# Patient Record
Sex: FEMALE
[De-identification: names, ages, dates, MRNs, and addresses within clinical notes are randomized per-mention and may not be internally consistent; named-entity substitution may affect disease eponyms.]

---

## 2019-07-09 ENCOUNTER — HOSPITAL ENCOUNTER (INPATIENT)
Dept: HOSPITAL 97 - L&D | Age: 24
LOS: 2 days | Discharge: HOME | End: 2019-07-11
Attending: SPECIALIST | Admitting: SPECIALIST
Payer: COMMERCIAL

## 2019-07-09 VITALS — BODY MASS INDEX: 25.7 KG/M2

## 2019-07-09 DIAGNOSIS — Z67.91: ICD-10-CM

## 2019-07-09 DIAGNOSIS — O99.323: ICD-10-CM

## 2019-07-09 DIAGNOSIS — O26.893: ICD-10-CM

## 2019-07-09 DIAGNOSIS — F15.90: ICD-10-CM

## 2019-07-09 DIAGNOSIS — Z3A.34: ICD-10-CM

## 2019-07-09 DIAGNOSIS — F12.90: ICD-10-CM

## 2019-07-09 LAB
BLD SMEAR INTERP: (no result)
GLUCOSE SERPLBLD-MCNC: 107 MG/DL (ref 74–106)
HCT VFR BLD CALC: 38 % (ref 36–45)
LYMPHOCYTES # SPEC AUTO: 1.1 K/UL (ref 0.7–4.9)
METHAMPHET UR QL SCN: POSITIVE
MORPHOLOGY BLD-IMP: (no result)
PMV BLD: 9.5 FL (ref 7.6–11.3)
RBC # BLD: 4.21 M/UL (ref 3.86–4.86)
RPR SER QL: (no result)
THC SERPL-MCNC: POSITIVE NG/ML
UA DIPSTICK W REFLEX MICRO PNL UR: (no result)

## 2019-07-09 PROCEDURE — 86592 SYPHILIS TEST NON-TREP QUAL: CPT

## 2019-07-09 PROCEDURE — 86850 RBC ANTIBODY SCREEN: CPT

## 2019-07-09 PROCEDURE — 87340 HEPATITIS B SURFACE AG IA: CPT

## 2019-07-09 PROCEDURE — 90707 MMR VACCINE SC: CPT

## 2019-07-09 PROCEDURE — 0HQ9XZZ REPAIR PERINEUM SKIN, EXTERNAL APPROACH: ICD-10-PCS

## 2019-07-09 PROCEDURE — 90715 TDAP VACCINE 7 YRS/> IM: CPT

## 2019-07-09 PROCEDURE — 86762 RUBELLA ANTIBODY: CPT

## 2019-07-09 PROCEDURE — 85025 COMPLETE CBC W/AUTO DIFF WBC: CPT

## 2019-07-09 PROCEDURE — 36415 COLL VENOUS BLD VENIPUNCTURE: CPT

## 2019-07-09 PROCEDURE — 87086 URINE CULTURE/COLONY COUNT: CPT

## 2019-07-09 PROCEDURE — 81003 URINALYSIS AUTO W/O SCOPE: CPT

## 2019-07-09 PROCEDURE — 90471 IMMUNIZATION ADMIN: CPT

## 2019-07-09 PROCEDURE — 86901 BLOOD TYPING SEROLOGIC RH(D): CPT

## 2019-07-09 PROCEDURE — 80307 DRUG TEST PRSMV CHEM ANLYZR: CPT

## 2019-07-09 PROCEDURE — 88307 TISSUE EXAM BY PATHOLOGIST: CPT

## 2019-07-09 PROCEDURE — 87088 URINE BACTERIA CULTURE: CPT

## 2019-07-09 PROCEDURE — 86900 BLOOD TYPING SEROLOGIC ABO: CPT

## 2019-07-09 PROCEDURE — 82947 ASSAY GLUCOSE BLOOD QUANT: CPT

## 2019-07-10 LAB
HCT VFR BLD CALC: 32.9 % (ref 36–45)
LYMPHOCYTES # SPEC AUTO: 3.3 K/UL (ref 0.7–4.9)
PMV BLD: 9.8 FL (ref 7.6–11.3)
RBC # BLD: 3.69 M/UL (ref 3.86–4.86)

## 2019-07-10 RX ADMIN — IBUPROFEN PRN MG: 200 TABLET, SUGAR COATED ORAL at 03:25

## 2019-07-10 NOTE — PN
Subjective:  Patient had a very good response to the first 25 mcg Cytotec, was kj every 1-2 
minutes, therefore it was decided not to put in the second Cytotec.  She progressed to approximately 
3 cm, 60-70% effaced, vertex, well applied.  She is still kj every 1-2 minutes.  Baby looks 
very good.  Vital signs are all stable.  She had admission urinalysis, it looks somewhat suspicious f
or a bladder infection.  We will probably have the culture sometime tomorrow.  Patient informed, this
 morning, rupture of membranes, clear fluid.  The patient had Stadol during the night.  Says her last
 delivery was completely natural without Stadol or anything but she came in active rapidly advancing 
labor.  She can have more Stadol if she wishes and of course, if she wishes an epidural that is up to
 her too.  Full labor talk again given this morning with the patient and .  Anticipate deliver
y sometime later today.





CLAIRE/BILLY

DD:  07/10/2019 07:19:26Voice ID:  [id number]

DT:  07/10/2019 11:46:30Report ID:  [id number]

## 2019-07-10 NOTE — PREOPHP
Date of Admission:  2019



A 24-year-old primigravida, 34 weeks, followed antepartum at [facility], admits to marijuana use, drop in t
o our institution in advanced labor, basically complete.  She says her membranes may have ruptured la
st night and started to have contractions this morning around 6 or 7 a.m.  She came here instead of [facility]ie [facility] to [place].  She was brought in by EMT, noted to be basically 9 cm to 10 cm, 100% effaced, vert
ex, +1 station, with the desire to push.  No lab available.  Patient claims no allergies.  She had an
 IV already.  Within 3 minutes of my arrival, patient started pushing and pushed for about 2-3 contra
ctions and spontaneously delivered a 4 pound 7 ounce female, Apgars 9 and 9.  Dr. Gramajo in pediatric
 attendance will get a drop-in drug screen on the patient.  She sustained a left labia minora lacerat
ion.  This was sutured with 2-0 chromic under local infiltration.  A single right vaginal minora lace
ration with single figure-of-eight stitch on that side.  Schultze delivery of the placenta, which was
 noted to be intact.  Estimated blood loss 300 cc.  Placenta will be saved for pathologic exam.  The 
patient is a very poor historian, but so far what we got is she thinks her bag of water may have brok
en last night and she did not seek medical care until shortly before admission to our hospital.  We a
re getting labs and records sent from [facility].  Apparently, the patient has a history of asthma and has 
been on albuterol inhaler.  Other lab indices show nothing of consequence at this time.  We did not h
er type and Rh, hepatitis or HIV status.  Of course she admits to marijuana, but will get another blo
od test, which apparently [facility] did and was negative at that time.  By good dates and ultrasound, she 
is 34 weeks.  Baby seems to be doing well at this point.



Final Diagnoses:  

1. labor and delivery.

2.Drop-in transportation.

3.Drug screen pending.

4.Uneventful vaginal delivery.





CLAIRE/BILLY

DD:  2019 15:49:10Voice ID:  [id number]
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
## 2019-07-11 VITALS — DIASTOLIC BLOOD PRESSURE: 65 MMHG | TEMPERATURE: 97.8 F | SYSTOLIC BLOOD PRESSURE: 116 MMHG

## 2019-07-11 RX ADMIN — IBUPROFEN PRN MG: 200 TABLET, SUGAR COATED ORAL at 17:35

## 2019-07-12 NOTE — DS
Date of Discharge:  07/11/2019



Alcira Hernandez, a 24-year-old primigravida at 34 weeks, followed antepartum by [facility], although it turns 
out, she had 1 visit.  Came into our institution in advanced labor, delivered rapidly of a 4 pounds 7
 ounce female, Apgars 9 and 9.  Small first-degree lacerations repaired with 2-0 chromic. 



DICTATION ENDS HERE





CLAIRE/BILLY

DD:  07/11/2019 07:23:50Voice ID:  [id number]

DT:  07/12/2019 05:56:44Report ID:  [id number]
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
## 2024-08-22 ENCOUNTER — HOSPITAL ENCOUNTER (EMERGENCY)
Dept: HOSPITAL 97 - ER | Age: 29
Discharge: HOME | End: 2024-08-22
Payer: COMMERCIAL

## 2024-08-22 VITALS — DIASTOLIC BLOOD PRESSURE: 69 MMHG | OXYGEN SATURATION: 100 % | TEMPERATURE: 98.2 F | SYSTOLIC BLOOD PRESSURE: 113 MMHG

## 2024-08-22 DIAGNOSIS — K02.9: Primary | ICD-10-CM

## 2024-08-22 PROCEDURE — 99284 EMERGENCY DEPT VISIT MOD MDM: CPT

## 2024-08-22 NOTE — XMS REPORT
Continuity of Care Document



                           Created on: 2024





ALCIRA HERNANDEZ

External Reference #: [id number]

: 1995

Sex: Female



Demographics





                                        Address             [address]

[place]  [address]

 

                                        Home Phone          [phone]

 

                                        Work Phone          [phone]

 

                                        Mobile Phone        [phone]

 

                                        Email Address       NONE

 

                                        Preferred Language  English

 

                                        Marital Status      Unknown

 

                                        [employer] Affiliation Unknown

 

                                        Race                Unknown

 

                                        Additional Race(s)  Unavailable

White

 

                                        Ethnic Group        Unknown





Author





                                        Name                Unknown

 

                                        Address             [address]

[place]  [address]

 

                                        [facility]

 

                                        Address             [address]

[address]

[place]  [address]

 

                                        Phone               [phone]





Support





                          Name         Relationship Address      Phone

 

                                Frankie Gonsales           [address]

[place]  [address]                     [phone]

 

                                [facility]    [address] [place]  [address]                        [phone]





Care Team Providers





                                Care Team Member Name Role            Phone

 

                                ENID FERRERA Primary Care Physician Unav

ailCATHERINE Kirby Attending Clinician UnavailCatherine Fields CNM Attending Clinician +

[phone]

 

                                Catherine Vazquez CNM Attending Clinician [phone]

 

                                ENID FERRERA Attending Clinician Unavail

able

 

                                Maisha Enid SCOTT Attending Clinician +

[phone]

 

                                Doctor Unassigned, No Name Attending Clinician U

navailable

 

                                [facility] Unava

ilable

 

                                AMISH WEINSTEIN Attending Clinician Unavailab

Amish Stanton Attending Clinician +

[phone]

 

                                ELIZABETH HOFFMAN Attending Clinician UnavailElizabeth Rueda Attending Clinician [phone]

 

                                Jvai Peña MD Attending Clinician [phone]

 

                                ATTILA NEW  Attending Clinician Unavailable

 

                                ATTILA NEW  Attending Clinician Unavailable

 

                                JAVI PEÑA   Admitting Clinician Unavailable







Payers





                    Payer Name Policy Type Policy Number Effective Date Expirati

on Date Source

 

                          [facility]              [id number]     

00:00:00                                            

 

                                                    [facility]                [id number]                              







Problems





                                                    Condition 

Name                                    Condition 

Details                                 Condition 

Category                  Status                    Onset 

Date                                    Resolution 

Date                                    Last 

Treatment 

Date                                    Treating 

Clinician                 Comments                  Source

 

                                                    History of 

asthma                                  History of 

asthma              Disease             Active               

00:00:

00                                                              Overview: 

Formattin

g of this 

note 

might be 

different 

from the 

original.

As child 

only                                    [facility]

 

                                                    Obesity 

affecting 

pregnancy                               Obesity 

affecting 

pregnancy           Disease             Active               

00:00:

00                                                               [facility]

 

                                                    Rh 

negative 

state in 

antepartum 

period                                  Rh 

negative 

state in 

antepartum 

period              Disease             Active              2020-0

3-24 

00:00:

00                                                              Overview: 

Formattin

g of this 

note 

might be 

different 

from the 

original.

Rhogam at 

28 weeks                                [facility]

 

                                                    High-risk 

pregnancy                               High-risk 

pregnancy           Disease             Active              2020-0

3-23 

00:00:

00                                                               [facility]

 

                                                    History of 

 

delivery                                History of 

 

delivery            Disease             Active              2020-0

3-23 

00:00:

00                                                              Overview: 

Formattin

g of this 

note 

might be 

different 

from the 

original.

Reports 

at 34 

weeks 

PPROM                                   [facility]

 

                                                    Other 

general 

counseling 

and advice 

for 

contracept

gloria 

management                              Other 

general 

counseling 

and advice 

for 

contracept

gloria 

management                Disease                   Resolve

d                                       2020

0-15 

00:00:

00                                      2024 

00:00:00                                2024 

14:04:22                                                    [facility]

 

                                                    Abdominal 

pain, 

generalize

d                                       Abdominal 

pain, 

generalize

d                         Disease                   Resolve

d                                       2020

0-15 

00:00:

00                                      2024 

00:00:00                                2024 

14:04:18                                                    [facility]

 

                                                    Obesity 

(BMI 

30-39.9)                                Obesity 

(BMI 

30-39.9)                  Disease                   Resolve

d                                       

3 

00:00:

00                                      2024 

00:00:00                                2024 

14:04:24                                                    [facility]

 

                                                    Postpartum 

care and 

examinatio

n of 

lactating 

mother                                  Postpartum 

care and 

examinatio

n of 

lactating 

mother                    Disease                   Resolve

d                                       24 

00:00:

00                                      2020-10-15 

00:00:00                                2020-10-15 

14:06:15                                                    [facility]

 

                                                    Normal 

labor                                   Normal 

labor                     Disease                   Resolve

d                                        

00:00:

00                                      2020-10-15 

00:00:00                                2020-10-15 

14:06:38                                                    [facility]

 

                                                    37 weeks 

gestation 

of 

pregnancy                               37 weeks 

gestation 

of 

pregnancy                 Disease                   Resolve

d                                       -0

8-31 

00:00:

00                                      2020-10-15 

00:00:00                                2020-10-15 

14:06:49                                                    [facility]

 

                                                    COVID-19 

virus 

infection                               COVID-19 

virus 

infection                 Disease                   Resolve

d                                       -

8-31 

00:00:

00                                      2020-10-15 

00:00:00                                2020-10-15 

14:06:48                                                    [facility]

 

                                                    Liveborn 

infant, of 

neal 

pregnancy, 

born in 

hospital 

by vaginal 

delivery                                Liveborn 

infant, of 

neal 

pregnancy, 

born in 

hospital 

by vaginal 

delivery                  Disease                   Resolve

d                                       

8- 

00:00:

00                                      2020-10-15 

00:00:00                                2020-10-15 

14:06:42                                                    [facility]

 

                                                    Maternal 

varicella, 

non-immune                              Maternal 

varicella, 

non-immune          Disease             Active              -

3-24 

00:00:

00                                      2020-10-15 

00:00:00                                2020-10-15 

14:06:11                                            Overview: 

Formattin

g of this 

note 

might be 

different 

from the 

original.

Address 

pp                                      [facility]

 

                                                    Multiparit

y                                       Multiparit

y                         Disease                   Resolve

d                                       -0

3-23 

00:00:

00                                      2020-10-15 

00:00:00                                2020-10-15 

14:06:40                                                    [facility]

 

                                                    Family 

history of 

defect of 

abdominal 

wall                                    Family 

history of 

defect of 

abdominal 

wall                      Disease                   Resolve

d                                       -0

4-09 

00:00:

00                                      2020-10-15 

00:00:00                                2020-10-15 

14:06:45                                                    [facility]

 

                                                    Vaginal 

bleeding 

in 

pregnancy                               Vaginal 

bleeding 

in 

pregnancy                 Disease                   Resolve

d                                       -0

7-23 

00:00:

00                                      2020 

00:00:00                                2020 

17:05:30                                                    [facility]

 

                                                    Yeast 

infection                               Yeast 

infection                 Disease                   Resolve

d                                       -0

3-26 

00:00:

00                                      2020 

00:00:00                                2020 

17:05:32                                                    [facility]







Allergies, Adverse Reactions, Alerts





                                                    Allergy 

Name                                    Allergy 

Type            Status          Severity        Reaction(s)     Onset 

Date                                    Inactive 

Date                                    Treating 

Clinician                 Comments                  Source

 

                                                    NO KNOWN 

ALLERGIE

S                                       Drug 

Class   Active                                                  [facility]







Social History





                    Social Habit Start Date Stop Date Quantity  Comments  Source

 

                                        ASSERTION           2024 

00:00:00                        Pregnancy                       [facility]

 

                    [facility]

 

                                                    Alcoholic beverage 

intake                                  2024 

00:00:00                                2024 

00:00:00                                Ex-drinker 

(finding)                                           [facility]

 

                                        Alcohol intake      2023 

00:00:00                                2023 

00:00:00                                Current 

non-drinker of 

alcohol 

(finding)                                           [facility]

 

                                                    Exposure to 

SARS-CoV-2 (event)                      2023 

00:00:00                                2023 

14:02:00            Not sure                                [facility]

 

                                                    Tobacco use and 

exposure                                2022 

00:00:00                                2022 

00:00:00                                Smokeless 

tobacco non-user                                    [facility]

 

                                                    History of Social 

function                                2022 

00:00:00                                2022 

00:00:00                                                    [facility]

 

                                                    Sex assigned at 

birth                                   1995 

00:00:00                                1995 

00:00:00                                                    [facility]







                          Smoking Status Start Date   Stop Date    Source

 

                          Never smoked tobacco                           [facility]







Medications





                                                    Ordered 

Medication 

Name                                    Filled 

Medication 

Name                                    Start 

Date                                    Stop 

Date                                    Current 

Medication?                             Ordering 

Clinician       Indication      Dosage          Frequency       Signature 

(SIG)               Comments            Components          Source

 

                                                    medroxyPROG

ESTERone 

(DEPO-PROVE

RA) syringe 

150 mg                                              2023 

15:30:

00                                       

19:05

:03     No              [id number] 150mg                                   [facility]

 

                                                    medroxyPROG

ESTERone 

(DEPO-PROVE

RA) syringe 

150 mg                                               

17:45:

00                                       

20:04

:00        No                    [id number]  150mg                 150 mg, 

Intramuscu

lar, 

[id number], 

3 doses, 

First dose 

on 22 at 

1245, Last 

dose on 

23 at 

1245, 

Routine                                                     [facility]

 

                                                    medroxyPROG

ESTERone 

(DEPO-PROVE

RA) 

injection 

150 mg                                              

3-17 

21:30:

00                                       

22:29

:00     No              [id number] 150mg                                   [facility]

 

                                                    medroxyPROG

ESTERone 

(DEPO-PROVE

RA) 

injection 

150 mg                                              2020-1

0-15 

19:15:

00                                       

15:04

:00     No              [id number] 150mg                                   [facility]

 

                                                    prenatal 

vit 

calc,iron,f

olic 

(PRENATAL 

VITAMIN 

ORAL)                                                

05:18:

34                                       

00:00

:00        No                                                     Take by 

mouth.                                                      [facility]

 

                                                    prenatal 

vitamin 

w/FA tablet                                          

00:00:

00                                       

00:00

:00                 No                                      [id number]

102                 1{tbl}                                  Take 1 

tablet by 

mouth 

daily.                                                      [facility]

 

                                                    docusate 

calcium 240 

mg capsule                                           

00:00:

00                                       

00:00

:00                 No                                      [id number]

102                 240mg                                   Take 1 

capsule by 

mouth once 

daily as 

needed for 

Constipati

on.                                                         [facility]

 

                                                    ferrous 

sulfate 325 

mg (65 mg 

iron) 

tablet                                               

00:00:

 

00:00

:00                 No                                      [id number]

102                 325mg                                   Take 1 

tablet by 

mouth 2 

(two) 

times 

daily.                                                      [facility]

 

                                                    ibuprofen 

600 mg 

tablet                                               

00:00:

00                                       

00:00

:00                 No                                      [id number]

102                 600mg                                   Take 1 

tablet by 

mouth 

every 6 

(six) 

hours as 

needed 

(Pain). 

Take with 

food or 

milk.                                                       [facility]

 

                                                    prenatal 

vitamin 

w/FA tablet                                          

00:00:

00                                       

00:00

:00                 No                                      [id number]

102                 1{tbl}                                  Take 1 

tablet by 

mouth 

daily.                                                      [facility]

 

                                                    albuterol 

90 

mcg/actuati

on inhaler                                           

00:00:

 

00:00

:00        No                    [id number]  2{puff}               Inhale 2 

Puffs 

every 6 

(six) 

hours as 

needed for 

Wheezing 

or 

Shortness 

of Breath.                                                  [facility]







Immunizations





                                                    Ordered 

Immunization Name                       Filled 

Immunization Name Date            Status          Comments        Source

 

                                HPV9                            2021-04-15 

00:00:00            Completed                               [facility]

 

                                HPV9                            2021-04-15 

00:00:00            Completed                               [facility]

 

                                HPV9                            2021-04-15 

00:00:00            Completed                               [facility]

 

                                HPV9                            2021-04-15 

00:00:00            Completed                               [facility]

 

                                HPV9                            2021-04-15 

00:00:00            Completed                               [facility]

 

                                HPV9                            2021-04-15 

00:00:00            Completed                               [facility]

 

                                HPV9                            2021 

00:00:00            Completed                               [facility]

 

                                HPV9                            2021 

00:00:00            Completed                               [facility]

 

                                HPV9                            2021 

00:00:00            Completed                               [facility]

 

                                HPV9                            2021 

00:00:00            Completed                               [facility]

 

                                HPV9                            2021 

00:00:00            Completed                               [facility]

 

                                HPV9                            2021 

00:00:00            Completed                               [facility]

 

                                HPV9                            2020-10-15 

00:00:00            Completed                               [facility]

 

                                HPV9                            2020-10-15 

00:00:00            Completed                               [facility]

 

                                HPV9                            2020-10-15 

00:00:00            Completed                               [facility]

 

                                HPV9                            2020-10-15 

00:00:00            Completed                               [facility]

 

                                HPV9                            2020-10-15 

00:00:00            Completed                               [facility]

 

                                HPV9                            2020-10-15 

00:00:00            Completed                               [facility]

 

                                TDAP                            2020 

00:00:00            Completed                               [facility]

 

                                                    Rho (d) Immune 

Globulin                                            2020 

00:00:00            Completed                               [facility]

 

                                TDAP                            2020 

00:00:00            Completed                               [facility]

 

                                                    Rho (d) Immune 

Globulin                                            2020 

00:00:00            Completed                               [facility]

 

                                TDAP                            2020 

00:00:00            Completed                               [facility]

 

                                                    Rho (d) Immune 

Globulin                                            2020 

00:00:00            Completed                               [facility]

 

                                TDAP                            2020 

00:00:00            Completed                               [facility]

 

                                                    Rho (d) Immune 

Globulin                                            2020 

00:00:00            Completed                               [facility]

 

                                TDAP                            2020 

00:00:00            Completed                               [facility]

 

                                                    Rho (d) Immune 

Globulin                                            2020 

00:00:00            Completed                               [facility]

 

                                TDAP                            2020 

00:00:00            Completed                               [facility]

 

                                                    Rho (d) Immune 

Globulin                                            2020 

00:00:00            Completed                               [facility]

 

                    TDAP                Unknown   Completed           [facility]

 

                                                    Rho (d) Immune 

Globulin                  Unknown      Completed                 [facility]

 

                    HPV9                Unknown   Completed           [facility]

 

                    HPV9                Unknown   Completed           [facility]

 

                    HPV9                Unknown   Completed           [facility]

 

                    TDAP                Unknown   Completed           [facility]

 

                                                    Rho (d) Immune 

Globulin                  Unknown      Completed                 [facility]

 

                    HPV9                Unknown   Completed           [facility]

 

                    TDAP                Unknown   Completed           [facility]

 

                                                    Rho (d) Immune 

Globulin                  Unknown      Completed                 [facility]

 

                    TDAP                Unknown   Completed           [facility]

 

                                                    Rho (d) Immune 

Globulin                  Unknown      Completed                 [facility]

 

                    TDAP                Unknown   Completed           [facility]

 

                                                    Rho (d) Immune 

Globulin                  Unknown      Completed                 [facility]

 

                    TDAP                Unknown   Completed           [facility]

 

                                                    Rho (d) Immune 

Globulin                  Unknown      Completed                 [facility]

 

                    TDAP                Unknown   Completed           [facility]

 

                                                    Rho (d) Immune 

Globulin                  Unknown      Completed                 [facility]

 

                    HPV9                Unknown   Completed           [facility]

 

                    HPV9                Unknown   Completed           [facility]

 

                    HPV9                Unknown   Completed           [facility]

 

                    TDAP                Unknown   Completed           [facility]

 

                                                    Rho (d) Immune 

Globulin                  Unknown      Completed                 [facility]

 

                    HPV9                Unknown   Completed           [facility]

 

                    HPV9                Unknown   Completed           [facility]

 

                    HPV9                Unknown   Completed           [facility]

 

                    TDAP                Unknown   Completed           [facility]

 

                                                    Rho (d) Immune 

Globulin                  Unknown      Completed                 [facility]

 

                    HPV9                Unknown   Completed           [facility]

 

                    HPV9                Unknown   Completed           [facility]

 

                    HPV9                Unknown   Completed           [facility]

 

                    TDAP                Unknown   Completed           [facility]

 

                                                    Rho (d) Immune 

Globulin                  Unknown      Completed                 [facility]

 

                    HPV9                Unknown   Completed           [facility]

 

                    HPV9                Unknown   Completed           [facility]

 

                    HPV9                Unknown   Completed           [facility]

 

                    TDAP                Unknown   Completed           [facility]

 

                                                    Rho (d) Immune 

Globulin                  Unknown      Completed                 [facility]

 

                    HPV9                Unknown   Completed           [facility]

 

                    HPV9                Unknown   Completed           [facility]

 

                    HPV9                Unknown   Completed           [facility]

 

                    TDAP                Unknown   Completed           [facility]

 

                                                    Rho (d) Immune 

Globulin                  Unknown      Completed                 [facility]

 

                    HPV9                Unknown   Completed           [facility]

 

                    HPV9                Unknown   Completed           [facility]

 

                    HPV9                Unknown   Completed           [facility]







Vital Signs





                      Vital Name Observation Time Observation Value Comments   S

ource

 

                                                    Systolic blood 

pressure        2024 18:38:00 122 mm[Hg]                      [facility]

 

                                                    Diastolic blood 

pressure        2024 18:38:00 67 mm[Hg]                       [facility]

 

                      Heart rate 2024 18:38:00 74 /min               [facility]

[facility]

 

                      Body temperature 2024 18:38:00 36.61 Bisi            

 [facility]

 

                      Body height 2024 18:38:00 162.6 cm              [facility]

 

                      Body weight 2024 18:38:00 88.089 kg             [facility]

 

                      BMI        2024 18:38:00 33.33 kg/m2            [facility]

 

                                                    Systolic blood 

pressure        2023 14:08:00 122 mm[Hg]                      [facility]

 

                                                    Diastolic blood 

pressure        2023 14:08:00 62 mm[Hg]                       [facility]

 

                      Heart rate 2023 14:08:00 54 /min               [facility]

[facility]

 

                      Body temperature 2023 14:08:00 35.94 Bisi            

 [facility]

 

                      Respiratory rate 2023 14:08:00 18 /min              

 [facility]

 

                      Body height 2023 14:08:00 162.6 cm              [facility]

 

                      Body weight 2023 14:08:00 82.01 kg              [facility]

 

                      BMI        2023 14:08:00 31.03 kg/m2            [facility]

[facility]

 

                                                    Systolic blood 

pressure        2023 20:02:00 121 mm[Hg]                      [facility]

 

                                                    Diastolic blood 

pressure        2023 20:02:00 77 mm[Hg]                       [facility]

 

                      Heart rate 2023 20:02:00 71 /min               [facility]

[facility]

 

                      Body temperature 2023 20:02:00 36.61 Bisi            

 [facility]

 

                      Respiratory rate 2023 20:02:00 18 /min              

 [facility]

 

                      Body height 2023 20:02:00 162.6 cm              [facility]

[facility]

 

                      Body weight 2023 20:02:00 81.829 kg             [facility]

[facility]

 

                      BMI        2023 20:02:00 30.97 kg/m2            [facility]

[facility]

 

                                                    Systolic blood 

pressure        2022 20:01:00 110 mm[Hg]                      [facility]

 

                                                    Diastolic blood 

pressure        2022 20:01:00 64 mm[Hg]                       [facility]

 

                      Heart rate 2022 20:01:00 67 /min               [facility]

[facility]

 

                      Body temperature 2022 20:01:00 36.56 Bisi            

 [facility]

 

                      Respiratory rate 2022 20:01:00 17 /min              

 [facility]

 

                      Body height 2022 20:01:00 162.6 cm              [facility]

[facility]

 

                      Body weight 2022 20:01:00 81.466 kg             [facility]

[facility]

 

                      BMI        2022 20:01:00 30.83 kg/m2            [facility]

[facility]

 

                                                    Systolic blood 

pressure        2022 16:53:00 110 mm[Hg]                      [facility]

 

                                                    Diastolic blood 

pressure        2022 16:53:00 62 mm[Hg]                       [facility]

 

                      Heart rate 2022 16:53:00 68 /min               Unive

[facility]

 

                      Body temperature 2022 16:53:00 36.56 Bisi            

 [facility]

 

                      Respiratory rate 2022 16:53:00 18 /min              

 [facility]

 

                      Body height 2022 16:53:00 162.6 cm              [facility]

[facility]

 

                      Body weight 2022 16:53:00 80.241 kg             [facility]

[facility]

 

                      BMI        2022 16:53:00 30.36 kg/m2            [facility]

[facility]

 

                                                    Systolic blood 

pressure        2022 18:25:00 118 mm[Hg]                      [facility]

 

                                                    Diastolic blood 

pressure        2022 18:25:00 70 mm[Hg]                       [facility]

 

                      Heart rate 2022 18:25:00 79 /min               Unive

[facility]

 

                      Body temperature 2022 18:25:00 36.61 Bisi            

 [facility]

 

                      Respiratory rate 2022 18:25:00 18 /min              

 [facility]

 

                      Body weight 2022 18:25:00 77.384 kg             [facility]

 

                      BMI        2022 18:25:00 29.28 kg/m2            [facility]

 

                                                    Systolic blood 

pressure        2022 20:38:00 125 mm[Hg]                      [facility]

 

                                                    Diastolic blood 

pressure        2022 20:38:00 67 mm[Hg]                       [facility]

 

                      Heart rate 2022 20:38:00 85 /min               Unive

[facility]

 

                      Body temperature 2022 20:38:00 36.44 Bisi            

 [facility]

 

                      Respiratory rate 2022 20:38:00 16 /min              

 [facility]

 

                      Body height 2022 20:38:00 162.6 cm              [facility]

 

                      Body weight 2022 20:38:00 81.965 kg             [facility]

 

                      BMI        2022 20:38:00 31.02 kg/m2            [facility]







Procedures





                                        Procedure           Date / Time 

Performed                 Performing Clinician      Source

 

                                                    ALPHA 

FETOPROTEIN-MATERNAL 

SER                 2024 19:35:00 Catherine Vazquez [facility]

 

                          CBC WITH DIFF 2024 19:35:00 Catherine Vazquez 

[facility]

 

                                                    GLYCOSYLATED HEMOGLOBIN 

(A1C)               2024 19:35:00 Catherine Vazquez [facility]

 

                                                    HEPATITIS B SURFACE 

ANTIGEN             2024 19:35:00 Catherine Vazquez [facility]

 

                          HCV ANTIBODY 2024 19:35:00 Catherine Vazquez [facility]

[facility]

 

                          HB ABO GROUPING 2024 19:35:00 Catherine Vazquez [facility]

 

                          POCT PREGNANCY TEST 2024 18:39:00 Arcelia Vazquez [facility]

 

                                                    POCT URINALYSIS W/O 

SPECIFIC GRAVITY    2024 18:39:00 Catherine Vazquez [facility]

 

                          POCT PREGNANCY TEST 2023 14:18:00 Alexsander Ferrera [facility] PATIENT FINANCIAL 

POLICY                    2023 13:47:46       Doctor Unassigned, No 

Name                                    [facility]

 

                                ASSIGNMENT OF BENEFITS 2022 19:31:14 Docto

r Unassigned, No 

Name                                    [facility]

 

                          POCT PREGNANCY TEST 2022 20:41:00 Alexsander Ferrera [facility]







Encounters





                                                    Start 

Date/Time                               End 

Date/Time                               Encounter 

Type                                    Admission 

Type                                    Attending 

[facility] 

Facility                                Care 

Department                              Encounter 

ID                                      Source

 

                                                    2021-10-29 

15:06:02         Outpatient P               [facility]    DALIA     [id number] [facility]

 

                                                    2024 

09:30:00                                2024 

09:30:00  Outpatient P                   [facility]      [id number] [facility]

 

                                                    2024 

00:00:00                                2024 

06:43:18            Telephone                               Catherine Vazquez                                [facility] 

OB/GYN 

[facility] 

MATERNAL 

& CHILD 

[facility]                                [id number]

[id number]

[id number]

[phone]

107                       [id number]                 [facility]

 

                                                    2024 

00:00:00                                2024 

13:39:18                                Case 

Management                                          Catherine Vazquez                                [facility] 

OB/GYN 

[facility] 

MATERNAL 

& CHILD 

[facility]                                ..840.114

[id number]

[id number]

[phone]

107                       [id number]                 [facility]

 

                                                    2024 

13:30:00                                2024 

14:34:14            Outpatient          R                   CATHERINE VAZQUEZ          [facility]            [id number]      [facility]

 

                                                    2024 

13:30:00                                2024 

14:34:14                                Initial 

Prenatal 

Visit                                               Catherine Vazquez                                [facility] 

OB/GYN 

[facility] 

MATERNAL 

& CHILD 

[facility]                                1..[id number]

[id number]

[id number]

[phone]

107                       [id number]                 [facility]

 

                                                    2024 

14:00:00                                2024 

14:00:00            Outpatient          R                   ENID FERRERA        [facility]            [id number]      [facility]

 

                                                    2024 

09:00:00                                2024 

09:00:00            Outpatient          R                   ENID FERRERA        [facility]            [id number]      [facility]

 

                                                    2024 

13:30:00                                2024 

13:30:00            Outpatient          R                   CATHERINE VAZQUEZ          [facility]            [id number]      [facility]

 

                                                    2024 

10:00:00                                2024 

10:00:00  Outpatient R                   [facility]      [id number] [facility]

 

                                                    2023 

08:15:00                                2023 

08:53:18            Outpatient          R                   ENID FERRERA        [facility]            [id number]      [facility]

 

                                                    2023 

08:15:00                                2023 

08:53:18                                Office 

Visit                                               Enid Ferrera                              [facility] 

OB/GYN 

[facility] 

MATERNAL 

& CHILD 

[facility]                                [id number]

[id number]

[id number]

[id number]

107                       [id number]                 [facility]

 

                                                    2023 

00:00:00                                2023 

00:00:00                                Orders 

Only                                                Doctor 

Unassigned, 

No Name                                 [facility]                                [id number]

[id number]

[id number]

[phone]

009                       [id number]                 [facility]

 

                                                    2023 

10:15:00                                2023 

10:15:00            Outpatient          R                   CATHERINE VAZQUEZ          [facility]            [id number]      [facility]

 

                                                    2023 

14:00:00                                2023 

14:00:00  Outpatient R                   [facility]      [id number] [facility]

 

                                                    2023 

13:30:00                                2023 

13:51:47            Outpatient          R                   ENID FERRERA        [facility]            [id number]      [facility]

 

                                                    2023 

13:30:00                                2023 

13:51:47                                Nurse 

Visit                                               [facility] 

[facility]                              [facility] 

OB/GYN 

[facility] 

& CHILD 

[facility]                                1..[id number]

[id number]

[id number]

[phone]

107                       [id number]                  [facility]

 

                                                    2022 

13:30:00                                2022 

14:08:17                                Nurse 

Visit                                               Visit, 

[facility] 

[facility]                                1..[id number]

[id number]

[id number]

[id number]

107                       [id number]                  [facility]

 

                                                    2022 

13:30:00                                2022 

13:30:00  Outpatient R                   [facility]      [id number] [facility]

 

                                                    2022 

13:30:00                                2022 

13:30:00            Outpatient          R                   ENID FERRERA        [facility]            [id number]      [facility]

 

                                                    2022 

00:00:00                                2022 

00:00:00                                Orders 

Only                                                Doctor 

Unassigned, 

No Name                                 [facility]                                ..840.114

[id number]

[id number]

[phone]

009                       [id number]                  [facility]

 

                                                    2022 

10:30:00                                2022 

10:57:11            Outpatient          R                   AMISH WEINSTEIN        [facility]            [id number]      [facility]

 

                                                    2022 

10:30:00                                2022 

10:57:11                                Nurse 

Visit                                               Visit, 

[facility] 

Nurse

Amish Weinstein                              [facility] 

OB/GYN 

[facility] 

& CHILD 

[facility]                                1..840.114

[id number]

[id number]

[phone]

107                       [id number]                  [facility]

 

                                                    2022 

14:00:00                                2022 

14:00:00            Outpatient          AMISH MARTÍNEZ        [facility]            [id number]      [facility]

 

                                                    2022 

14:30:00                                2022 

14:45:00                                Nurse 

Visit                                               [facility], 

[facility] 

Nurse

Enid Ferrera                              [facility] 

OB/GYN 

[facility]                                ..840.114

350.1.13.10

4.2.7.2.686

[id number]

107                       [id number]                  [facility]

 

                                                    2022 

14:30:00                                2022 

14:30:00  Outpatient R                   [facility]      [id number] [facility]

 

                                                    2022 

14:30:00                                2022 

14:30:00            Outpatient          R                   ENID FERRERA        [facility]            [id number]      [facility]

 

                                                    2022 

15:30:00                                2022 

16:34:23            Outpatient          R                   ENID FERRERA        [facility]            [id number]      [facility]

 

                                                    2022 

15:30:00                                2022 

16:34:23                                Office 

Visit                                               Enid Ferrera                              [facility] 

[facility]                                ..840.114

350.1.13.10

[id number]

[id number]

107                       [id number]                  [facility]

 

                                                    2022 

15:00:00                                2022 

15:00:00            Outpatient          AMISH MARTÍNEZ        [facility]            [id number]      [facility]

 

                                                    2022 

13:00:00                                2022 

13:00:00            Outpatient          NICHOLE                   ELIZABETH HOFFMAN           [facility]            [id number]      [facility]

 

                                                    2021-10-19 

10:00:00                                2021-10-19 

10:00:00            Outpatient          NICHOLE                   ELIZABETH HOFFMAN           [facility]            [id number]      [facility]

 

                                                    2021-10-11 

13:45:27                                2021-10-11 

14:10:37                                Nurse 

Visit                                               Visit, 

[facility] 

Nurse

Elizabeth Hoffman                                 [facility] 

OB/GYN 

[facility] 

MATERNAL 

& CHILD 

HEALTH 

[facility]                                1.2.840.114

[id number]

[id number]

[phone]

107                       [id number]                  [facility]

 

                                                    2021-10-11 

13:30:00                                2021-10-11 

13:30:00            Outpatient          NICHOLE                   ELIZABETH HOFFMAN           [facility]            [id number]      [facility]

 

                                                    2021-10-11 

00:00:00                                2021-10-11 

00:00:00                                Orders 

Only                                                Doctor 

Unassigned, 

No Name                                 [facility]                                [id number]

350.1.13.10

[id number]

[phone]

009                       [id number]                  [facility]

 

                                                    2021 

10:30:00                                2021 

10:30:00  Outpatient R                   [facility]      [id number] [facility]

 

                                                    2021 

10:00:00                                2021 

10:00:00  Outpatient R                   [facility]      [id number] [facility]

 

                                                    2021 

00:00:00                                2021 

00:00:00                                Patient 

Secure Msg                                          Doctor 

Unassigned, 

No Name                                 [facility]                                [id number]

350.1.13.10

[id number]

[phone]

019                       [id number]                  [facility]

 

                                                    2021 

14:00:00                                2021 

14:00:00  Outpatient R                   [facility]      [id number] [facility]

 

                                                    2021-04-15 

15:00:00                                2021-04-15 

15:00:00  Outpatient R                   [facility]      [id number] [facility]

 

                                                    2021 

13:00:00                                2021 

13:00:00  Outpatient R                   [facility]      [id number] [facility]

 

                                                    2021 

13:46:11                                2021 

14:11:52                                Nurse 

Visit                                               Visit, 

[facility] 

Nurse                                   [facility] 

OB/GYN 

[facility] 

& CHILD 

[facility]                                1.2.840.114

350.1.13.10

[id number]

[id number]

107                       [id number]                  

 

                                                    2021 

13:30:00                                2021 

13:30:00  Outpatient R                   [facility]      [id number] [facility]

 

                                                    2021 

00:00:00                                2021 

00:00:00            Refill                                  Javi Peña                                     [facility]                                [id number]

[id number]

[id number]

[id number]

134                       [id number]                  

 

                                                    2020-10-29 

00:00:00                                2020-10-29 

00:00:00                                Patient 

Secure Msg                                          Doctor 

Unassigned, 

No Name                                 [facility] 

OB/GYN 

[facility] 

& CHILD 

[facility]                                [id number]

350.1.13.10

4.2.7.2.686

[phone]

107                       [id number]                  [facility]

 

                                                    2020-10-15 

13:32:21                                2020-10-15 

14:35:01                                Office 

Visit                                               Enid Ferrera                              [facility] 

OB/GYN 

[facility] 

MATERNAL 

& CHILD 

[facility]                                1.2.840.114

350.1.13.10

4.2.7.2.686

[id number]

107                       [id number]                  

 

                                                    2020-10-15 

13:45:00                                2020-10-15 

13:45:00            Outpatient          R                   ENID FERRERA        [facility]            [id number]      [facility]

 

                                                    2020-10-15 

13:15:00                                2020-10-15 

13:15:00            Outpatient          R                   AMISH WEINSTEIN        [facility]            [id number]      [facility]

 

                                                    2020 

14:30:00                                2020 

14:30:00            Outpatient          AMISH MARTÍNEZ        [facility]            [id number]      [facility]

 

                                                    2020 

00:00:00                                2020 

00:00:00                                Patient 

Secure Msg                                          Doctor 

Unassigned, 

No Name                                 [facility] 

OB/GYN 

[facility] 

MATERNAL 

& CHILD 

HEALTH 

[facility]                                [id number]

350.1.13.10

[id number]

[phone]

107                       [id number]                  [facility]

 

                                                    2020 

10:45:00                                2020 

10:45:00            Outpatient          NICHOLE                   JS 

AMISH        [facility]            [id number]      [facility]

 

                                                    2020 

00:00:00                                2020 

00:00:00                                Patient 

Secure Msg                                          Javi Peña                                     [facility]                                [id number]

350.1.13.10

[id number]

[phone]

134                       [id number]                  [facility]

 

                                                    2020 

00:00:00                                2020 

00:00:00                                Patient 

Secure Msg                                          Javi Peña                                     [facility]                                [id number]

350.1.13.10

4.2.7.2.686

[id number]

134                       [id number]                  [facility]

 

                                                    2020 

14:00:00                                2020 

14:00:00            Outpatient          AMISH MARTÍNEZ        [facility]            [id number]      [facility]

 

                                                    2020 

14:00:00                                2020 

14:00:00            Outpatient          ELIZABETH JOHN           [facility]            [id number]      [facility]

 

                                                    2020 

13:45:00                                2020 

13:45:00            Outpatient          AMISH MARTÍNEZ        [facility]            [id number]      [facility]

 

                                                    2020 

15:15:00                                2020 

15:15:00  Outpatient P                   [facility]      [id number] [facility]

 

                                                    2020 

15:30:00                                2020 

15:30:00            Outpatient          R                   ENID FERRERA        [facility]            [id number]      [facility]

 

                                                    2020 

14:00:00                                2020 

14:00:00            Outpatient          R                   MARTITAPEENID        [facility]            [id number]      [facility]

 

                                                    2020 

00:00:00                                2020 

00:00:00                                Patient 

Secure Msg                                          Doctor 

Unassigned, 

No Name                                 [facility] 

OB/GYN 

[facility] 

MATERNAL 

& CHILD 

HEALTH 

[facility]                                [id number]

[id number]

[id number]

[phone]

107                       [id number]                  [facility]

 

                                                    2020 

12:45:00                                2020 

12:45:00            Outpatient          R                   AKINSIPEENID        [facility]            [id number]      [facility]

 

                                                    2020 

13:00:00                                2020 

13:00:00            Outpatient          R                   AKINSIPEENID        [facility]            [id number]      [facility]

 

                                                    2020-07-15 

15:30:00                                2020-07-15 

15:30:00            Outpatient          R                   AKINSIPEENID        [facility]            [id number]      [facility]

 

                                                    2020 

13:00:00                                2020 

13:00:00  Outpatient R                   [facility]      [id number] [facility]

 

                                                    2020 

13:30:00                                2020 

13:30:00  Outpatient R                   [facility]      [id number] [facility]

 

                                                    2020 

16:00:00                                2020 

16:00:00            Outpatient          R                   AKINSIPEENID        [facility]            [id number]      [facility]

 

                                                    2020 

12:45:00                                2020 

12:45:00            Outpatient          R                   AKINSIPE 

ENID        [facility]            [id number]      [facility]

 

                                                    2020 

10:30:00                                2020 

10:30:00            Outpatient          R                   AKINTESSAENID        [facility]            [id number]      [facility]

 

                                                    2020 

15:15:00                                2020 

15:15:00            Outpatient          R                   AKINTESSAENID        [facility]            [id number]      [facility]

 

                                                    2020 

14:45:00                                2020 

14:45:00  Outpatient P                   [facility]      [id number] [facility]

 

                                                    2020 

14:00:00                                2020 

14:00:00            Outpatient          R                   AKINSIPE, 

ENID        [facility]            [id number]      [facility]

 

                                                    2020 

13:00:00                                2020 

13:00:00  Outpatient P                   [facility]      [id number] [facility]

 

                                                    2020 

14:00:00                                2020 

14:00:00            Outpatient          R                   AKINTESSA 

ENID        [facility]            [id number]      [facility]

 

                                                    2020 

14:30:00                                2020 

14:30:00            Outpatient          R                   NEW, 

YESSI SIMMONSTA        [facility]            [id number]      [facility]

 

                                                    2020 

13:00:00                                2020 

13:00:00  Outpatient P                   [facility]      [id number] [facility]

 

                                                    2020 

10:30:00                                2020 

10:30:00  Outpatient R                   [facility]      [id number] [facility]

 

                                                    2020 

15:45:00                                2020 

15:45:00            Outpatient          R                   AKINSIPE, 

ENID        [facility]            [id number]      [facility]

 

                                                    2020 

15:30:00                                2020 

15:30:00            Outpatient          R                   AKINSIJULIAN DASILVAILOLA        [facility]            [id number]      [facility]

 

                                                    2020 

13:00:00                                2020 

13:00:00            Outpatient          R                   AKINSIJULIAN DASILVAILOLA        [facility]            [id number]      [facility]







Results





                    Test Description Test Time Test Comments Results   Result Co

mments Source







                                        

 

                                        

 

                                        

 

                                        

 

                                        

 

                                        

 

                                        

 

                                        

 

                                        





[id number]39:[facility]* 



                      Test Item  Value      Reference Range Interpretation Comme

nts

 

                      POCT PREG (test code = 1605) Positive                     

    

 

                                                    On board controls acceptable

 with C 

Line (test code = 3574) Yes                                             

 

                      POCT PREG LOT # (test code = 3575)                        

          

 

                                                    POCT PREG TEST  DATE (

test 

code = 3576)                                                    

 

                                                    Lab Interpretation (test cod

e = 

38698-5)        Normal                                          





[facility] PREGNANCY [id number] 14:18:00* 



                      Test Item  Value      Reference Range Interpretation Comme

nts

 

                      POCT PREG (test code = 1605) Negative                     

    

 

                                                    On board controls acceptable

 with C 

Line (test code = 3574) Yes                                             

 

                      POCT PREG LOT # (test code = 3575)                        

          

 

                                                    POCT PREG TEST  DATE (

test 

code = 3576)                                                    





[facility] PREGNANCY [id number] 14:18:00* 



                      Test Item  Value      Reference Range Interpretation Comme

nts

 

                      POCT PREG (test code = 1605) Negative                     

    

 

                                                    On board controls acceptable

 with C 

Line (test code = 3574) Yes                                             

 

                      POCT PREG LOT # (test code = 3575)                        

          

 

                                                    POCT PREG TEST  DATE (

test 

code = 3576)                                                    





[facility] PREGNANCY [id number] 20:41:00* 



                      Test Item  Value      Reference Range Interpretation Comme

nts

 

                      POCT PREG (test code = 1605) Negative                     

    

 

                                                    On board controls acceptable

 with C 

Line (test code = 3574) Yes                                             

 

                      POCT PREG LOT # (test code = 3575)                        

          

 

                                                    POCT PREG TEST  DATE (

test 

code = 3576)                                                    





[facility]



Notes





                          Date/Time    Note         Provider     Source

 

                                        2024 15:48:45 





Formatting of this note might be 

different from the original.

Alcira Hernandez is a 29 year old female

Pt is calling to get orders placed for 

an u/s . Please call

Electronically signed by Karoline Power 

at 2024 3:49 PM REAANN Power               [facility]

## 2024-08-22 NOTE — ER
Nurse's Notes                                                                                     

 [facility]                                                                 

Name: Alcira Hernandez                                                                                

Age: 29 yrs                                                                                       

Sex: Female                                                                                       

: 1995                                                                                   

MRN: [id number]                                                                                   

Arrival Date: 2024                                                                          

Time: 07:45                                                                                       

Account#: [id number]                                                                            

Bed 14                                                                                            

Private MD:                                                                                       

Diagnosis: Dental caries, unspecified                                                             

                                                                                                  

Presentation:                                                                                     

                                                                                             

07:47 Chief complaint: EMS states: Pt brought in via EMS for c/o Lt sided toothache and       dd2 

      headache. Pt states she is 6 months pregnant. Coronavirus screen: At this time, the         

      client does not indicate any symptoms associated with coronavirus-19. Ebola Screen: No      

      symptoms or risks identified at this time. Initial Sepsis Screen: Does the patient meet     

      any 2 criteria? No. Patient's initial sepsis screen is negative. Does the patient have      

      a suspected source of infection? No. Patient's initial sepsis screen is negative. Risk      

      Assessment: Do you want to hurt yourself or someone else? Patient reports no desire to      

      harm self or others. Onset of symptoms is unknown.                                          

07:47 Method Of Arrival: EMS: [facility]                                                       dd2 

07:47 Acuity: EMILY 4                                                                           dd2 

                                                                                                  

Triage Assessment:                                                                                

07:49 General: Appears in no apparent distress. Behavior is calm, cooperative. Pain:          dd2 

      Complains of pain in Lt sided tooth pain. EENT: Poor dentition noted. Reports pain in       

      Lt sided tooth pain.                                                                        

                                                                                                  

OB/GYN:                                                                                           

07:49  1, Full Term 3, Premature 0,  0, Living 3, Pregnancy unknown            dd2 

                                                                                                  

Historical:                                                                                       

- Allergies:                                                                                      

07:49 No Known Allergies;                                                                     dd2 

- Home Meds:                                                                                      

07:49 None [Active];                                                                          dd2 

- PMHx:                                                                                           

07:49 None;                                                                                   dd2 

- PSHx:                                                                                           

07:49 None;                                                                                   dd2 

                                                                                                  

- Immunization history:: Adult Immunizations unknown.                                             

- Infectious Disease History:: Denies.                                                            

- Social history:: Smoking status: Patient denies any tobacco usage or history of.                

- Family history:: not pertinent.                                                                 

                                                                                                  

                                                                                                  

Screenin:53 [facility] ED Fall Risk Assessment (Adult) History of falling in the last 3 months,       dd2 

      including since admission No falls in past 3 months (0 pts) Confusion or Disorientation     

      No (0 pts) Intoxicated or Sedated No (0 pts) Impaired Gait No (0 pts) Mobility Assist       

      Device Used No (0 pt) Altered Elimination No (0 pt) Score/Fall Risk Level 0 - 2 = Low       

      Risk Oriented to surroundings, Maintained a safe environment, Hourly rounding (assess       

      needs \T\ fall precautionary measures) done. Abuse screen: Denies threats or abuse.         

      Nutritional screening: No deficits noted. Tuberculosis screening: No symptoms or risk       

      factors identified.                                                                         

                                                                                                  

Assessment:                                                                                       

07:53 Neuro: No deficits noted. Cardiovascular: No deficits noted. Respiratory: No deficits   dd2 

      noted. GI: Reports nausea. : No deficits noted. EENT: No deficits noted. Derm: No         

      deficits noted. Musculoskeletal: No deficits noted.                                         

                                                                                                  

Vital Signs:                                                                                      

07:47  / 69; Pulse 73; Resp 15; Temp 98.2; Pulse Ox 100% ;                              dd2 

                                                                                                  

ED Course:                                                                                        

07:46 Patient arrived in ED.                                                                  dd2 

07:46 WALI ODELL, RN is Primary Nurse.                                                      dd2 

07:46 Neo Zuniga MD is Attending Physician.                                            rt  

07:49 Triage completed.                                                                       dd2 

07:49 Arm band placed on right wrist. Patient placed in an exam room, on a stretcher, on      dd2 

      oxygen, Patient notified of wait time.                                                      

07:53 Patient has correct armband on for positive identification. Bed in low position. Call   dd2 

      light in reach. Provided Education on: call light.                                          

07:53 No provider procedures requiring assistance completed. Patient did not have IV access   dd2 

      during this emergency room visit.                                                           

                                                                                                  

Administered Medications:                                                                         

08:04 Drug: Amoxicillin  mg PO once Route: PO;                                          dd2 

08:12 Follow up: Response: Medication administered at discharge.                              dd2 

08:04 Drug: Promethazine PO 25 mg PO once Route: PO;                                          dd2 

08:11 Follow up: Response: Medication administered at discharge.                              dd2 

                                                                                                  

                                                                                                  

Medication:                                                                                       

07:53 VIS not applicable for this client.                                                     dd2 

                                                                                                  

Outcome:                                                                                          

08:06 Discharge ordered by MD.                                                                rt  

08:12 Discharged to home ambulatory,                                                          dd2 

08:12 Condition: stable                                                                           

08:12 Discharge instructions given to patient, Instructed on discharge instructions, follow       

      up and referral plans. medication usage, Demonstrated understanding of instructions,        

      follow-up care, medications, Prescriptions given X 2,                                       

08:12 Patient left the ED.                                                                    dd2 

                                                                                                  

Signatures:                                                                                       

Neo Zuniga MD MD   rt                                                   

WALI ODELL, RN                        RN   dd2                                                  

                                                                                                  

Corrections: (The following items were deleted from the chart)                                    

07:58 07:53 GI: No deficits noted. dd2                                                        dd2 

08:06 07:49 EENT: Reports pain in Lt sided tooth pain dd2                                     dd2 

                                                                                                  

**************************************************************************************************

## 2024-08-22 NOTE — EDPHYS
Physician Documentation                                                                           

 [facility]                                                                 

Name: Alcira Hernandez                                                                                

Age: 29 yrs                                                                                       

Sex: Female                                                                                       

: 1995                                                                                   

MRN: [id number]                                                                                   

Arrival Date: 2024                                                                          

Time: 07:45                                                                                       

Account#: [id number]                                                                            

Bed 14                                                                                            

Private MD:                                                                                       

ED Physician Neo Zuniga                                                                     

HPI:                                                                                              

                                                                                             

09:10 This 29 yrs old  Female presents to ER via EMS with complaints of Toothache.    rt  

09:10 Patient presents to the ED with several days of bilateral toothache. Patient states     rt  

      that she has multiple dental cavities, and has an appointment with a dentist next week.     

      Reports the pain has worsened. Denies other acute complaints at this time, symptoms are     

      moderate in severity, no other aggravating or alleviating factors..                         

                                                                                                  

OB/GYN:                                                                                           

07:49  1, Full Term 3, Premature 0,  0, Living 3, Pregnancy unknown            dd2 

                                                                                                  

Historical:                                                                                       

- Allergies:                                                                                      

07:49 No Known Allergies;                                                                     dd2 

- Home Meds:                                                                                      

07:49 None [Active];                                                                          dd2 

- PMHx:                                                                                           

07:49 None;                                                                                   dd2 

- PSHx:                                                                                           

07:49 None;                                                                                   dd2 

                                                                                                  

- Immunization history:: Adult Immunizations unknown.                                             

- Infectious Disease History:: Denies.                                                            

- Social history:: Smoking status: Patient denies any tobacco usage or history of.                

- Family history:: not pertinent.                                                                 

                                                                                                  

                                                                                                  

ROS:                                                                                              

09:10 Constitutional: Negative for fever, chills, and weight loss, Cardiovascular: Negative   rt  

      for chest pain, palpitations, and edema, Respiratory: Negative for shortness of breath,     

      cough, wheezing, and pleuritic chest pain, Abdomen/GI: Negative for abdominal pain,         

      nausea, vomiting, diarrhea, and constipation, MS/Extremity: Negative for injury and         

      deformity, Skin: Negative for injury, rash, and discoloration, Neuro: Negative for          

      headache, weakness, numbness, tingling, and seizure,                                        

09:10 ENT: Positive for dental pain, Negative for Difficulty swallowing,                          

                                                                                                  

Exam:                                                                                             

09:10 Constitutional:  This is a well developed, well nourished patient who is awake, alert,  rt  

      and in no acute distress. Head/Face:  Normocephalic, atraumatic. Chest/axilla:  Normal      

      chest wall appearance and motion.  Nontender with no deformity.  No lesions are             

      appreciated. Cardiovascular:  Regular rate and rhythm with a normal S1 and S2.  No          

      gallops, murmurs, or rubs.  Normal PMI, no JVD.  No pulse deficits. Respiratory:  Lungs     

      have equal breath sounds bilaterally, clear to auscultation and percussion.  No rales,      

      rhonchi or wheezes noted.  No increased work of breathing, no retractions or nasal          

      flaring. Abdomen/GI:  Soft, non-tender, with normal bowel sounds.  No distension or         

      tympany.  No guarding or rebound.  No evidence of tenderness throughout.                    

09:10 ENT: Multiple dental caries noted, no drainable abscess.                                    

                                                                                                  

Vital Signs:                                                                                      

07:47  / 69; Pulse 73; Resp 15; Temp 98.2; Pulse Ox 100% ;                              dd2 

                                                                                                  

MDM:                                                                                              

07:51 Patient medically screened.                                                             rt  

09:10 Differential diagnosis: dental caries, dental abscess. Data reviewed: vital signs,      rt  

      nurses notes. I considered the following discharge prescriptions or medication              

      management in the emergency department Medications were administered in the Emergency       

      Department. See MAR. Test considered but Not performed: CT: No signs of drainable           

      abscess, PTA, RPA, Javier's angina, CT scan is not indicated. Counseling: I had a           

      detailed discussion with the patient and/or guardian regarding the historical points,       

      exam findings, and any diagnostic results supporting the discharge/admit diagnosis, the     

      need for outpatient follow up.                                                              

                                                                                                  

Administered Medications:                                                                         

08:04 Drug: Amoxicillin  mg PO once Route: PO;                                          dd2 

08:12 Follow up: Response: Medication administered at discharge.                              dd2 

08:04 Drug: Promethazine PO 25 mg PO once Route: PO;                                          dd2 

08:11 Follow up: Response: Medication administered at discharge.                              dd2 

                                                                                                  

                                                                                                  

Disposition Summary:                                                                              

24 08:06                                                                                    

Discharge Ordered                                                                                 

 Notes:       Location: Home                                                                        
  rt

      Problem: new                                                                            rt  

      Symptoms: are unchanged                                                                 rt  

      Condition: Stable                                                                       rt  

      Diagnosis                                                                                   

        - Dental caries, unspecified                                                          rt  

      Followup:                                                                               rt  

        - With: Private Physician                                                                  

        - When: 5 - 6 days                                                                         

        - Reason:                                                                                  

      Discharge Instructions:                                                                     

        - Discharge Summary Sheet                                                             rt  

        - Dental Caries, Adult                                                                rt  

      Forms:                                                                                      

        - Medication Reconciliation Form                                                      rt  

        - Antibiotic Education                                                                rt  

        - Prescription Opioid Use                                                             rt  

        - Patient Portal Instructions                                                         rt  

        - Leadership Thank You Letter                                                         rt  

      Prescriptions:                                                                              

        - Amoxicillin 875 mg Oral Tablet                                                           

            - take 1 tablet ORAL route every 12 hours for 10 days; 20 tablet; Refills: 0,     rt  

      Product Selection Permitted                                                                 

        - promethazine 25 mg Oral Tablet                                                           

            - take 1 tablet ORAL route every 6 hours As needed; 20 tablet; Refills: 0,        rt  

      Product Selection Permitted                                                                 

Signatures:                                                                                       

Neo Zuniga MD MD   rt                                                   

WALI ODELL, RN                        RN   dd2                                                  

                                                                                                  

**************************************************************************************************